# Patient Record
Sex: MALE | Race: WHITE
[De-identification: names, ages, dates, MRNs, and addresses within clinical notes are randomized per-mention and may not be internally consistent; named-entity substitution may affect disease eponyms.]

---

## 2017-11-24 ENCOUNTER — HOSPITAL ENCOUNTER (EMERGENCY)
Dept: HOSPITAL 8 - ED | Age: 52
Discharge: HOME | End: 2017-11-24
Payer: MEDICAID

## 2017-11-24 VITALS — DIASTOLIC BLOOD PRESSURE: 89 MMHG | SYSTOLIC BLOOD PRESSURE: 138 MMHG

## 2017-11-24 VITALS — HEIGHT: 76 IN | WEIGHT: 288.81 LBS | BODY MASS INDEX: 35.17 KG/M2

## 2017-11-24 DIAGNOSIS — Z76.0: Primary | ICD-10-CM

## 2017-11-24 DIAGNOSIS — E11.9: ICD-10-CM

## 2017-11-24 DIAGNOSIS — I10: ICD-10-CM

## 2017-11-24 PROCEDURE — 85610 PROTHROMBIN TIME: CPT

## 2017-11-24 PROCEDURE — 36415 COLL VENOUS BLD VENIPUNCTURE: CPT

## 2017-11-24 PROCEDURE — 99283 EMERGENCY DEPT VISIT LOW MDM: CPT

## 2019-03-11 ENCOUNTER — HOSPITAL ENCOUNTER (INPATIENT)
Dept: HOSPITAL 8 - ED | Age: 54
LOS: 2 days | Discharge: PSYCHIATRIC OR SUBSTANCE USE FACILITY | DRG: 918 | End: 2019-03-13
Attending: HOSPITALIST | Admitting: HOSPITALIST
Payer: MEDICAID

## 2019-03-11 VITALS — WEIGHT: 310.41 LBS | HEIGHT: 76 IN | BODY MASS INDEX: 37.8 KG/M2

## 2019-03-11 VITALS — DIASTOLIC BLOOD PRESSURE: 87 MMHG | SYSTOLIC BLOOD PRESSURE: 147 MMHG

## 2019-03-11 DIAGNOSIS — T39.1X2A: ICD-10-CM

## 2019-03-11 DIAGNOSIS — F43.10: ICD-10-CM

## 2019-03-11 DIAGNOSIS — E11.9: ICD-10-CM

## 2019-03-11 DIAGNOSIS — M19.90: ICD-10-CM

## 2019-03-11 DIAGNOSIS — T60.4X2A: Primary | ICD-10-CM

## 2019-03-11 DIAGNOSIS — F12.90: ICD-10-CM

## 2019-03-11 DIAGNOSIS — F31.9: ICD-10-CM
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
## 2019-03-11 DIAGNOSIS — I10: ICD-10-CM

## 2019-03-11 DIAGNOSIS — Z81.8: ICD-10-CM

## 2019-03-11 DIAGNOSIS — D68.59: ICD-10-CM

## 2019-03-11 DIAGNOSIS — R45.851: ICD-10-CM

## 2019-03-11 LAB
ALBUMIN SERPL-MCNC: 4.1 G/DL (ref 3.4–5)
ALP SERPL-CCNC: 87 U/L (ref 45–117)
ALT SERPL-CCNC: 52 U/L (ref 12–78)
ANION GAP SERPL CALC-SCNC: 6 MMOL/L (ref 5–15)
APAP SERPL-MCNC: < 2 MCG/ML (ref 10–30)
APTT BLD: 29 SECONDS (ref 25–31)
BASOPHILS # BLD AUTO: 0.08 X10^3/UL (ref 0–0.1)
BASOPHILS NFR BLD AUTO: 1 % (ref 0–1)
BILIRUB SERPL-MCNC: 0.5 MG/DL (ref 0.2–1)
CALCIUM SERPL-MCNC: 8.4 MG/DL (ref 8.5–10.1)
CHLORIDE SERPL-SCNC: 111 MMOL/L (ref 98–107)
CREAT SERPL-MCNC: 1.07 MG/DL (ref 0.7–1.3)
EOSINOPHIL # BLD AUTO: 0.14 X10^3/UL (ref 0–0.4)
EOSINOPHIL NFR BLD AUTO: 2 % (ref 1–7)
ERYTHROCYTE [DISTWIDTH] IN BLOOD BY AUTOMATED COUNT: 13.7 % (ref 9.4–14.8)
INR PPP: 0.96 (ref 0.93–1.1)
LYMPHOCYTES # BLD AUTO: 3.04 X10^3/UL (ref 1–3.4)
LYMPHOCYTES NFR BLD AUTO: 36 % (ref 22–44)
MCH RBC QN AUTO: 30.2 PG (ref 27.5–34.5)
MCHC RBC AUTO-ENTMCNC: 33.3 G/DL (ref 33.2–36.2)
MCV RBC AUTO: 90.6 FL (ref 81–97)
MD: NO
MONOCYTES # BLD AUTO: 0.8 X10^3/UL (ref 0.2–0.8)
MONOCYTES NFR BLD AUTO: 9 % (ref 2–9)
NEUTROPHILS # BLD AUTO: 4.44 X10^3/UL (ref 1.8–6.8)
NEUTROPHILS NFR BLD AUTO: 52 % (ref 42–75)
O2 FLOW: (no result) L/MIN
PH BLDV: 7.42 PH (ref 7.32–7.42)
PLATELET # BLD AUTO: 191 X10^3/UL (ref 130–400)
PMV BLD AUTO: 10.9 FL (ref 7.4–10.4)
PROT SERPL-MCNC: 7 G/DL (ref 6.4–8.2)
PROTHROMBIN TIME: 10.1 SECONDS (ref 9.6–11.5)
RBC # BLD AUTO: 5.46 X10^6/UL (ref 4.38–5.82)
VANCOMYCIN TROUGH SERPL-MCNC: < 1.7 MG/DL (ref 2.8–20)

## 2019-03-11 PROCEDURE — 84100 ASSAY OF PHOSPHORUS: CPT

## 2019-03-11 PROCEDURE — 80329 ANALGESICS NON-OPIOID 1 OR 2: CPT

## 2019-03-11 PROCEDURE — 85025 COMPLETE CBC W/AUTO DIFF WBC: CPT

## 2019-03-11 PROCEDURE — 84443 ASSAY THYROID STIM HORMONE: CPT

## 2019-03-11 PROCEDURE — 85730 THROMBOPLASTIN TIME PARTIAL: CPT

## 2019-03-11 PROCEDURE — 83735 ASSAY OF MAGNESIUM: CPT

## 2019-03-11 PROCEDURE — 99285 EMERGENCY DEPT VISIT HI MDM: CPT

## 2019-03-11 PROCEDURE — 80307 DRUG TEST PRSMV CHEM ANLYZR: CPT

## 2019-03-11 PROCEDURE — 93005 ELECTROCARDIOGRAM TRACING: CPT

## 2019-03-11 PROCEDURE — G0480 DRUG TEST DEF 1-7 CLASSES: HCPCS

## 2019-03-11 PROCEDURE — 94640 AIRWAY INHALATION TREATMENT: CPT

## 2019-03-11 PROCEDURE — 85610 PROTHROMBIN TIME: CPT

## 2019-03-11 PROCEDURE — 82803 BLOOD GASES ANY COMBINATION: CPT

## 2019-03-11 PROCEDURE — 80053 COMPREHEN METABOLIC PANEL: CPT

## 2019-03-11 PROCEDURE — 36415 COLL VENOUS BLD VENIPUNCTURE: CPT

## 2019-03-11 RX ADMIN — SODIUM CHLORIDE, SODIUM LACTATE, POTASSIUM CHLORIDE, AND CALCIUM CHLORIDE SCH MLS/HR: .6; .31; .03; .02 INJECTION, SOLUTION INTRAVENOUS at 20:30

## 2019-03-11 NOTE — NUR
i am assuming care of this pt from simón (pallavi) at this time. sbar report was 
exchanged at the bedside.

## 2019-03-11 NOTE — NUR
PT AMBULATED TO AND FROM THE RESTROOM WITH A STEADY GAIT. URINE SAMPLE 
PROVIDED, AND WALKED TO THE LAB FOR ANALYSIS.

## 2019-03-11 NOTE — NUR
BIBA FOR SUICIDE ATTEMPT. PT INGESTED ARSENIC X 2 TODAY "I THOUGHT IT WOULD BE 
QUICK".  FIRST INGESTION WAS AROUND 9AM STATES IT WAS ABOUT A PINCH, WHICH HE 
WAS ABLE TO KEEP DOWN. THEN AROUND "LUNCHTIME" INGESTED ABOUT A TEASPOON, 
STARTED VOMITING ALMOST IMMEDIATELY. NOW STATES HE HAS A SHARP HEADACHE, DIZZY, 
FACE AND TONGUE ARE NUMB.  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 
ANTIDEPRESSANTS

## 2019-03-11 NOTE — NUR
REPORT RECEIVED FROM BAILEE DUPREE, ASSUMED CARE OF PT. P RESTING QUIETLY, NO 
DISTRESS NOTED, STATES CAN'T GET TASTE OUT OF HIS MOUTH. CALM AND COOPERATIVE, 
NO S/S INTERNAL BLEEDING

## 2019-03-11 NOTE — NUR
TASK RN:

PT IS RESTING IN ROOM WITH NADN, EQUAL CHEST RISE AND GOOD CAP REFILL, NADN. NO 
NEEDS AT THIS TIME.

## 2019-03-11 NOTE — NUR
PTS BELONGINGS LOCKED UP IN SECURE LOCKER AND LABELED. PT WILL BE MOVED TO A 
SECURED ROOM WHEN AVAILABLE. PT  PLACED ON CONT. SPO2, BP, AND CARDIAC MONITOR, 
VSS.

## 2019-03-11 NOTE — NUR
PT IS RESTING COMFORTABLY ON AN E.R. GURNEY WHILE AWAITING THE RESULTS OF 
DIAGNOSTICS. THERE ARE NO ACUTE CHANGES NTED AT THIS TIME, AND I WILL CONTINUE 
TO MONITOR AND TREAT AS ORDERED, AS WELL AS PRN.

## 2019-03-12 VITALS — SYSTOLIC BLOOD PRESSURE: 138 MMHG | DIASTOLIC BLOOD PRESSURE: 89 MMHG

## 2019-03-12 VITALS — DIASTOLIC BLOOD PRESSURE: 87 MMHG | SYSTOLIC BLOOD PRESSURE: 143 MMHG

## 2019-03-12 VITALS — SYSTOLIC BLOOD PRESSURE: 146 MMHG | DIASTOLIC BLOOD PRESSURE: 86 MMHG

## 2019-03-12 VITALS — DIASTOLIC BLOOD PRESSURE: 88 MMHG | SYSTOLIC BLOOD PRESSURE: 137 MMHG

## 2019-03-12 LAB
ALBUMIN SERPL-MCNC: 3.3 G/DL (ref 3.4–5)
ALP SERPL-CCNC: 75 U/L (ref 45–117)
ALT SERPL-CCNC: 45 U/L (ref 12–78)
ANION GAP SERPL CALC-SCNC: 4 MMOL/L (ref 5–15)
BASOPHILS # BLD AUTO: 0.03 X10^3/UL (ref 0–0.1)
BASOPHILS NFR BLD AUTO: 0 % (ref 0–1)
BILIRUB SERPL-MCNC: 0.6 MG/DL (ref 0.2–1)
CALCIUM SERPL-MCNC: 8 MG/DL (ref 8.5–10.1)
CHLORIDE SERPL-SCNC: 111 MMOL/L (ref 98–107)
CREAT SERPL-MCNC: 0.9 MG/DL (ref 0.7–1.3)
EOSINOPHIL # BLD AUTO: 0.18 X10^3/UL (ref 0–0.4)
EOSINOPHIL NFR BLD AUTO: 3 % (ref 1–7)
ERYTHROCYTE [DISTWIDTH] IN BLOOD BY AUTOMATED COUNT: 13.8 % (ref 9.4–14.8)
LYMPHOCYTES # BLD AUTO: 1.64 X10^3/UL (ref 1–3.4)
LYMPHOCYTES NFR BLD AUTO: 26 % (ref 22–44)
MCH RBC QN AUTO: 30.1 PG (ref 27.5–34.5)
MCHC RBC AUTO-ENTMCNC: 33.1 G/DL (ref 33.2–36.2)
MCV RBC AUTO: 90.7 FL (ref 81–97)
MD: NO
MONOCYTES # BLD AUTO: 0.57 X10^3/UL (ref 0.2–0.8)
MONOCYTES NFR BLD AUTO: 9 % (ref 2–9)
NEUTROPHILS # BLD AUTO: 3.81 X10^3/UL (ref 1.8–6.8)
NEUTROPHILS NFR BLD AUTO: 61 % (ref 42–75)
PLATELET # BLD AUTO: 164 X10^3/UL (ref 130–400)
PMV BLD AUTO: 10.1 FL (ref 7.4–10.4)
PROT SERPL-MCNC: 6.4 G/DL (ref 6.4–8.2)
RBC # BLD AUTO: 5.24 X10^6/UL (ref 4.38–5.82)
TSH SERPL-ACNC: 1.45 MIU/L (ref 0.36–3.74)

## 2019-03-12 RX ADMIN — CYCLOBENZAPRINE HYDROCHLORIDE PRN MG: 10 TABLET, FILM COATED ORAL at 01:02

## 2019-03-12 RX ADMIN — CYCLOBENZAPRINE HYDROCHLORIDE PRN MG: 10 TABLET, FILM COATED ORAL at 10:35

## 2019-03-12 RX ADMIN — CYCLOBENZAPRINE HYDROCHLORIDE PRN MG: 10 TABLET, FILM COATED ORAL at 20:42

## 2019-03-12 RX ADMIN — SODIUM CHLORIDE, SODIUM LACTATE, POTASSIUM CHLORIDE, AND CALCIUM CHLORIDE SCH MLS/HR: .6; .31; .03; .02 INJECTION, SOLUTION INTRAVENOUS at 15:03

## 2019-03-13 ENCOUNTER — HOSPITAL ENCOUNTER (INPATIENT)
Dept: HOSPITAL 8 - 3E | Age: 54
LOS: 44 days | Discharge: HOME | DRG: 885 | End: 2019-04-26
Attending: PSYCHIATRY & NEUROLOGY | Admitting: PSYCHIATRY & NEUROLOGY
Payer: MEDICAID

## 2019-03-13 VITALS — HEIGHT: 76 IN | BODY MASS INDEX: 34.42 KG/M2 | WEIGHT: 282.63 LBS

## 2019-03-13 VITALS — SYSTOLIC BLOOD PRESSURE: 123 MMHG | DIASTOLIC BLOOD PRESSURE: 71 MMHG

## 2019-03-13 VITALS — SYSTOLIC BLOOD PRESSURE: 138 MMHG | DIASTOLIC BLOOD PRESSURE: 92 MMHG

## 2019-03-13 VITALS — SYSTOLIC BLOOD PRESSURE: 127 MMHG | DIASTOLIC BLOOD PRESSURE: 73 MMHG

## 2019-03-13 VITALS — DIASTOLIC BLOOD PRESSURE: 93 MMHG | SYSTOLIC BLOOD PRESSURE: 150 MMHG

## 2019-03-13 VITALS — DIASTOLIC BLOOD PRESSURE: 76 MMHG | SYSTOLIC BLOOD PRESSURE: 122 MMHG

## 2019-03-13 DIAGNOSIS — Z59.0: ICD-10-CM

## 2019-03-13 DIAGNOSIS — Z81.8: ICD-10-CM

## 2019-03-13 DIAGNOSIS — G89.29: ICD-10-CM

## 2019-03-13 DIAGNOSIS — E78.5: ICD-10-CM

## 2019-03-13 DIAGNOSIS — I10: ICD-10-CM

## 2019-03-13 DIAGNOSIS — F12.10: ICD-10-CM

## 2019-03-13 DIAGNOSIS — M10.9: ICD-10-CM

## 2019-03-13 DIAGNOSIS — Z79.899: ICD-10-CM

## 2019-03-13 DIAGNOSIS — F31.30: Primary | ICD-10-CM

## 2019-03-13 DIAGNOSIS — E66.9: ICD-10-CM

## 2019-03-13 DIAGNOSIS — Z82.49: ICD-10-CM

## 2019-03-13 DIAGNOSIS — G47.00: ICD-10-CM

## 2019-03-13 PROCEDURE — 80053 COMPREHEN METABOLIC PANEL: CPT

## 2019-03-13 PROCEDURE — 85025 COMPLETE CBC W/AUTO DIFF WBC: CPT

## 2019-03-13 PROCEDURE — 93005 ELECTROCARDIOGRAM TRACING: CPT

## 2019-03-13 PROCEDURE — 80061 LIPID PANEL: CPT

## 2019-03-13 PROCEDURE — 36415 COLL VENOUS BLD VENIPUNCTURE: CPT

## 2019-03-13 PROCEDURE — 82272 OCCULT BLD FECES 1-3 TESTS: CPT

## 2019-03-13 RX ADMIN — Medication SCH MG: at 22:50

## 2019-03-13 RX ADMIN — ACETAMINOPHEN PRN MG: 325 TABLET, FILM COATED ORAL at 16:51

## 2019-03-13 RX ADMIN — CYCLOBENZAPRINE HYDROCHLORIDE PRN MG: 10 TABLET, FILM COATED ORAL at 05:10

## 2019-03-13 RX ADMIN — ACETAMINOPHEN PRN MG: 325 TABLET, FILM COATED ORAL at 20:31

## 2019-03-13 RX ADMIN — SODIUM CHLORIDE, SODIUM LACTATE, POTASSIUM CHLORIDE, AND CALCIUM CHLORIDE SCH MLS/HR: .6; .31; .03; .02 INJECTION, SOLUTION INTRAVENOUS at 09:12

## 2019-03-13 SDOH — ECONOMIC STABILITY - HOUSING INSECURITY: HOMELESSNESS: Z59.0

## 2019-03-14 VITALS — SYSTOLIC BLOOD PRESSURE: 133 MMHG | DIASTOLIC BLOOD PRESSURE: 84 MMHG

## 2019-03-14 VITALS — DIASTOLIC BLOOD PRESSURE: 86 MMHG | SYSTOLIC BLOOD PRESSURE: 129 MMHG

## 2019-03-14 RX ADMIN — Medication SCH MG: at 20:27

## 2019-03-14 RX ADMIN — ACETAMINOPHEN PRN MG: 325 TABLET, FILM COATED ORAL at 15:21

## 2019-03-14 RX ADMIN — OXCARBAZEPINE SCH MG: 150 TABLET, FILM COATED ORAL at 20:27

## 2019-03-15 VITALS — DIASTOLIC BLOOD PRESSURE: 86 MMHG | SYSTOLIC BLOOD PRESSURE: 136 MMHG

## 2019-03-15 VITALS — DIASTOLIC BLOOD PRESSURE: 98 MMHG | SYSTOLIC BLOOD PRESSURE: 137 MMHG

## 2019-03-15 RX ADMIN — BUPROPION HYDROCHLORIDE SCH MG: 150 TABLET, EXTENDED RELEASE ORAL at 09:06

## 2019-03-15 RX ADMIN — QUETIAPINE FUMARATE SCH MG: 100 TABLET ORAL at 19:57

## 2019-03-15 RX ADMIN — OXCARBAZEPINE SCH MG: 150 TABLET, FILM COATED ORAL at 19:57

## 2019-03-15 RX ADMIN — Medication SCH MG: at 19:56

## 2019-03-15 RX ADMIN — ACETAMINOPHEN PRN MG: 325 TABLET, FILM COATED ORAL at 09:31

## 2019-03-16 VITALS — SYSTOLIC BLOOD PRESSURE: 134 MMHG | DIASTOLIC BLOOD PRESSURE: 97 MMHG

## 2019-03-16 VITALS — DIASTOLIC BLOOD PRESSURE: 85 MMHG | SYSTOLIC BLOOD PRESSURE: 113 MMHG

## 2019-03-16 RX ADMIN — QUETIAPINE FUMARATE SCH MG: 100 TABLET ORAL at 19:34

## 2019-03-16 RX ADMIN — OXCARBAZEPINE SCH MG: 150 TABLET, FILM COATED ORAL at 19:34

## 2019-03-16 RX ADMIN — ACETAMINOPHEN PRN MG: 325 TABLET, FILM COATED ORAL at 19:34

## 2019-03-16 RX ADMIN — BUPROPION HYDROCHLORIDE SCH MG: 150 TABLET, EXTENDED RELEASE ORAL at 09:26

## 2019-03-16 RX ADMIN — Medication SCH MG: at 19:34

## 2019-03-17 VITALS — DIASTOLIC BLOOD PRESSURE: 95 MMHG | SYSTOLIC BLOOD PRESSURE: 134 MMHG

## 2019-03-17 VITALS — DIASTOLIC BLOOD PRESSURE: 81 MMHG | SYSTOLIC BLOOD PRESSURE: 123 MMHG

## 2019-03-17 RX ADMIN — Medication SCH MG: at 20:21

## 2019-03-17 RX ADMIN — OXCARBAZEPINE SCH MG: 150 TABLET, FILM COATED ORAL at 20:21

## 2019-03-17 RX ADMIN — QUETIAPINE FUMARATE SCH MG: 100 TABLET ORAL at 20:21

## 2019-03-17 RX ADMIN — BUPROPION HYDROCHLORIDE SCH MG: 150 TABLET, EXTENDED RELEASE ORAL at 09:40

## 2019-03-17 RX ADMIN — ACETAMINOPHEN PRN MG: 325 TABLET, FILM COATED ORAL at 14:40

## 2019-03-18 VITALS — SYSTOLIC BLOOD PRESSURE: 123 MMHG | DIASTOLIC BLOOD PRESSURE: 83 MMHG

## 2019-03-18 VITALS — SYSTOLIC BLOOD PRESSURE: 138 MMHG | DIASTOLIC BLOOD PRESSURE: 89 MMHG

## 2019-03-18 RX ADMIN — Medication SCH MG: at 20:24

## 2019-03-18 RX ADMIN — ACETAMINOPHEN PRN MG: 325 TABLET, FILM COATED ORAL at 08:18

## 2019-03-18 RX ADMIN — BUPROPION HYDROCHLORIDE SCH MG: 150 TABLET, EXTENDED RELEASE ORAL at 08:15

## 2019-03-18 RX ADMIN — QUETIAPINE FUMARATE SCH MG: 100 TABLET ORAL at 20:24

## 2019-03-18 RX ADMIN — OXCARBAZEPINE SCH MG: 150 TABLET, FILM COATED ORAL at 20:24

## 2019-03-19 VITALS — DIASTOLIC BLOOD PRESSURE: 86 MMHG | SYSTOLIC BLOOD PRESSURE: 136 MMHG

## 2019-03-19 VITALS — DIASTOLIC BLOOD PRESSURE: 78 MMHG | SYSTOLIC BLOOD PRESSURE: 146 MMHG

## 2019-03-19 RX ADMIN — BUPROPION HYDROCHLORIDE SCH MG: 150 TABLET, EXTENDED RELEASE ORAL at 11:35

## 2019-03-19 RX ADMIN — QUETIAPINE FUMARATE SCH MG: 100 TABLET ORAL at 20:52

## 2019-03-19 RX ADMIN — OXCARBAZEPINE SCH MG: 150 TABLET, FILM COATED ORAL at 20:49

## 2019-03-19 RX ADMIN — Medication SCH MG: at 20:50

## 2019-03-20 VITALS — SYSTOLIC BLOOD PRESSURE: 118 MMHG | DIASTOLIC BLOOD PRESSURE: 82 MMHG

## 2019-03-20 VITALS — SYSTOLIC BLOOD PRESSURE: 110 MMHG | DIASTOLIC BLOOD PRESSURE: 72 MMHG

## 2019-03-20 RX ADMIN — ACETAMINOPHEN PRN MG: 325 TABLET, FILM COATED ORAL at 20:53

## 2019-03-20 RX ADMIN — ALLOPURINOL SCH MG: 300 TABLET ORAL at 08:49

## 2019-03-20 RX ADMIN — BUPROPION HYDROCHLORIDE SCH MG: 150 TABLET, EXTENDED RELEASE ORAL at 08:49

## 2019-03-20 RX ADMIN — Medication SCH MG: at 20:52

## 2019-03-20 RX ADMIN — ACETAMINOPHEN PRN MG: 325 TABLET, FILM COATED ORAL at 08:49

## 2019-03-20 RX ADMIN — OXCARBAZEPINE SCH MG: 150 TABLET, FILM COATED ORAL at 20:53

## 2019-03-20 RX ADMIN — QUETIAPINE FUMARATE SCH MG: 100 TABLET ORAL at 20:53

## 2019-03-21 VITALS — DIASTOLIC BLOOD PRESSURE: 86 MMHG | SYSTOLIC BLOOD PRESSURE: 142 MMHG

## 2019-03-21 VITALS — SYSTOLIC BLOOD PRESSURE: 137 MMHG | DIASTOLIC BLOOD PRESSURE: 97 MMHG

## 2019-03-21 RX ADMIN — ACETAMINOPHEN PRN MG: 325 TABLET, FILM COATED ORAL at 08:59

## 2019-03-21 RX ADMIN — BUPROPION HYDROCHLORIDE SCH MG: 150 TABLET, EXTENDED RELEASE ORAL at 08:59

## 2019-03-21 RX ADMIN — Medication SCH MG: at 21:28

## 2019-03-21 RX ADMIN — ALLOPURINOL SCH MG: 300 TABLET ORAL at 08:59

## 2019-03-21 RX ADMIN — QUETIAPINE FUMARATE SCH MG: 100 TABLET ORAL at 21:28

## 2019-03-21 RX ADMIN — OXCARBAZEPINE SCH MG: 150 TABLET, FILM COATED ORAL at 21:28

## 2019-03-22 VITALS — SYSTOLIC BLOOD PRESSURE: 127 MMHG | DIASTOLIC BLOOD PRESSURE: 88 MMHG

## 2019-03-22 VITALS — SYSTOLIC BLOOD PRESSURE: 128 MMHG | DIASTOLIC BLOOD PRESSURE: 95 MMHG

## 2019-03-22 RX ADMIN — QUETIAPINE FUMARATE SCH MG: 100 TABLET ORAL at 21:56

## 2019-03-22 RX ADMIN — Medication SCH MG: at 21:56

## 2019-03-22 RX ADMIN — BUPROPION HYDROCHLORIDE SCH MG: 150 TABLET, EXTENDED RELEASE ORAL at 08:32

## 2019-03-22 RX ADMIN — OXCARBAZEPINE SCH MG: 150 TABLET, FILM COATED ORAL at 21:55

## 2019-03-22 RX ADMIN — ALLOPURINOL SCH MG: 300 TABLET ORAL at 08:33

## 2019-03-23 VITALS — DIASTOLIC BLOOD PRESSURE: 90 MMHG | SYSTOLIC BLOOD PRESSURE: 149 MMHG

## 2019-03-23 VITALS — DIASTOLIC BLOOD PRESSURE: 100 MMHG | SYSTOLIC BLOOD PRESSURE: 138 MMHG

## 2019-03-23 LAB
CHOL/HDL RATIO: 6.6
HDL CHOL %: 15 % (ref 26–37)
HDL CHOLESTEROL (DIRECT): 28 MG/DL (ref 40–60)
LDL CHOLESTEROL,CALCULATED: 108 MG/DL (ref 54–169)
LDLC/HDLC SERPL: 3.9 {RATIO} (ref 0.5–3)
TRIGL SERPL-MCNC: 241 MG/DL (ref 50–200)
VLDLC SERPL CALC-MCNC: 48 MG/DL (ref 0–25)

## 2019-03-23 RX ADMIN — OXCARBAZEPINE SCH MG: 150 TABLET, FILM COATED ORAL at 20:16

## 2019-03-23 RX ADMIN — Medication SCH MG: at 20:16

## 2019-03-23 RX ADMIN — BUPROPION HYDROCHLORIDE SCH MG: 150 TABLET, EXTENDED RELEASE ORAL at 09:36

## 2019-03-23 RX ADMIN — ALLOPURINOL SCH MG: 300 TABLET ORAL at 09:36

## 2019-03-23 RX ADMIN — QUETIAPINE FUMARATE SCH MG: 100 TABLET ORAL at 20:16

## 2019-03-24 VITALS — DIASTOLIC BLOOD PRESSURE: 94 MMHG | SYSTOLIC BLOOD PRESSURE: 130 MMHG

## 2019-03-24 VITALS — SYSTOLIC BLOOD PRESSURE: 136 MMHG | DIASTOLIC BLOOD PRESSURE: 88 MMHG

## 2019-03-24 RX ADMIN — Medication SCH MG: at 19:54

## 2019-03-24 RX ADMIN — BUPROPION HYDROCHLORIDE SCH MG: 150 TABLET, EXTENDED RELEASE ORAL at 08:20

## 2019-03-24 RX ADMIN — QUETIAPINE FUMARATE SCH MG: 100 TABLET ORAL at 19:54

## 2019-03-24 RX ADMIN — OXCARBAZEPINE SCH MG: 150 TABLET, FILM COATED ORAL at 19:54

## 2019-03-24 RX ADMIN — ALLOPURINOL SCH MG: 300 TABLET ORAL at 08:21

## 2019-03-25 VITALS — SYSTOLIC BLOOD PRESSURE: 139 MMHG | DIASTOLIC BLOOD PRESSURE: 97 MMHG

## 2019-03-25 VITALS — SYSTOLIC BLOOD PRESSURE: 132 MMHG | DIASTOLIC BLOOD PRESSURE: 81 MMHG

## 2019-03-25 RX ADMIN — Medication SCH MG: at 20:07

## 2019-03-25 RX ADMIN — BUPROPION HYDROCHLORIDE SCH MG: 150 TABLET, EXTENDED RELEASE ORAL at 09:00

## 2019-03-25 RX ADMIN — OXCARBAZEPINE SCH MG: 150 TABLET, FILM COATED ORAL at 20:04

## 2019-03-25 RX ADMIN — Medication SCH MG: at 20:04

## 2019-03-25 RX ADMIN — ALLOPURINOL SCH MG: 300 TABLET ORAL at 09:00

## 2019-03-25 RX ADMIN — QUETIAPINE FUMARATE SCH MG: 100 TABLET ORAL at 20:04

## 2019-03-26 VITALS — SYSTOLIC BLOOD PRESSURE: 129 MMHG | DIASTOLIC BLOOD PRESSURE: 81 MMHG

## 2019-03-26 VITALS — SYSTOLIC BLOOD PRESSURE: 130 MMHG | DIASTOLIC BLOOD PRESSURE: 95 MMHG

## 2019-03-26 RX ADMIN — BUPROPION HYDROCHLORIDE SCH MG: 150 TABLET, EXTENDED RELEASE ORAL at 09:57

## 2019-03-26 RX ADMIN — QUETIAPINE FUMARATE SCH MG: 100 TABLET ORAL at 20:28

## 2019-03-26 RX ADMIN — Medication SCH MG: at 20:28

## 2019-03-26 RX ADMIN — OXCARBAZEPINE SCH MG: 150 TABLET, FILM COATED ORAL at 20:28

## 2019-03-26 RX ADMIN — ALLOPURINOL SCH MG: 300 TABLET ORAL at 09:57

## 2019-03-27 VITALS — SYSTOLIC BLOOD PRESSURE: 100 MMHG | DIASTOLIC BLOOD PRESSURE: 65 MMHG

## 2019-03-27 VITALS — SYSTOLIC BLOOD PRESSURE: 137 MMHG | DIASTOLIC BLOOD PRESSURE: 92 MMHG

## 2019-03-27 RX ADMIN — BUPROPION HYDROCHLORIDE SCH MG: 150 TABLET, EXTENDED RELEASE ORAL at 08:30

## 2019-03-27 RX ADMIN — ALLOPURINOL SCH MG: 300 TABLET ORAL at 08:30

## 2019-03-27 RX ADMIN — QUETIAPINE FUMARATE SCH MG: 100 TABLET ORAL at 20:24

## 2019-03-27 RX ADMIN — OXCARBAZEPINE SCH MG: 150 TABLET, FILM COATED ORAL at 20:24

## 2019-03-27 RX ADMIN — Medication SCH MG: at 20:24

## 2019-03-28 VITALS — SYSTOLIC BLOOD PRESSURE: 129 MMHG | DIASTOLIC BLOOD PRESSURE: 86 MMHG

## 2019-03-28 VITALS — SYSTOLIC BLOOD PRESSURE: 129 MMHG | DIASTOLIC BLOOD PRESSURE: 88 MMHG

## 2019-03-28 RX ADMIN — ALLOPURINOL SCH MG: 300 TABLET ORAL at 08:08

## 2019-03-28 RX ADMIN — QUETIAPINE FUMARATE SCH MG: 100 TABLET ORAL at 20:07

## 2019-03-28 RX ADMIN — BUPROPION HYDROCHLORIDE SCH MG: 150 TABLET, EXTENDED RELEASE ORAL at 08:08

## 2019-03-28 RX ADMIN — Medication SCH MG: at 20:07

## 2019-03-28 RX ADMIN — OXCARBAZEPINE SCH MG: 150 TABLET, FILM COATED ORAL at 20:07

## 2019-03-29 VITALS — DIASTOLIC BLOOD PRESSURE: 88 MMHG | SYSTOLIC BLOOD PRESSURE: 131 MMHG

## 2019-03-29 VITALS — DIASTOLIC BLOOD PRESSURE: 68 MMHG | SYSTOLIC BLOOD PRESSURE: 104 MMHG

## 2019-03-29 RX ADMIN — ALLOPURINOL SCH MG: 300 TABLET ORAL at 08:24

## 2019-03-29 RX ADMIN — Medication SCH MG: at 20:19

## 2019-03-29 RX ADMIN — BUPROPION HYDROCHLORIDE SCH MG: 150 TABLET, EXTENDED RELEASE ORAL at 08:24

## 2019-03-29 RX ADMIN — OXCARBAZEPINE SCH MG: 150 TABLET, FILM COATED ORAL at 20:19

## 2019-03-29 RX ADMIN — QUETIAPINE FUMARATE SCH MG: 100 TABLET ORAL at 20:19

## 2019-03-30 VITALS — SYSTOLIC BLOOD PRESSURE: 120 MMHG | DIASTOLIC BLOOD PRESSURE: 83 MMHG

## 2019-03-30 VITALS — SYSTOLIC BLOOD PRESSURE: 108 MMHG | DIASTOLIC BLOOD PRESSURE: 77 MMHG

## 2019-03-30 RX ADMIN — QUETIAPINE FUMARATE SCH MG: 100 TABLET ORAL at 20:35

## 2019-03-30 RX ADMIN — OXCARBAZEPINE SCH MG: 150 TABLET, FILM COATED ORAL at 20:35

## 2019-03-30 RX ADMIN — BUPROPION HYDROCHLORIDE SCH MG: 150 TABLET, EXTENDED RELEASE ORAL at 08:40

## 2019-03-30 RX ADMIN — ALLOPURINOL SCH MG: 300 TABLET ORAL at 08:40

## 2019-03-30 RX ADMIN — Medication SCH MG: at 20:36

## 2019-03-31 VITALS — DIASTOLIC BLOOD PRESSURE: 90 MMHG | SYSTOLIC BLOOD PRESSURE: 147 MMHG

## 2019-03-31 VITALS — SYSTOLIC BLOOD PRESSURE: 138 MMHG | DIASTOLIC BLOOD PRESSURE: 87 MMHG

## 2019-03-31 RX ADMIN — OXCARBAZEPINE SCH MG: 150 TABLET, FILM COATED ORAL at 20:28

## 2019-03-31 RX ADMIN — Medication SCH MG: at 20:28

## 2019-03-31 RX ADMIN — ALLOPURINOL SCH MG: 300 TABLET ORAL at 09:08

## 2019-03-31 RX ADMIN — BUPROPION HYDROCHLORIDE SCH MG: 150 TABLET, EXTENDED RELEASE ORAL at 09:08

## 2019-03-31 RX ADMIN — ACETAMINOPHEN PRN MG: 325 TABLET, FILM COATED ORAL at 20:28

## 2019-03-31 RX ADMIN — QUETIAPINE FUMARATE SCH MG: 100 TABLET ORAL at 20:28

## 2019-04-01 VITALS — DIASTOLIC BLOOD PRESSURE: 87 MMHG | SYSTOLIC BLOOD PRESSURE: 128 MMHG

## 2019-04-01 VITALS — DIASTOLIC BLOOD PRESSURE: 85 MMHG | SYSTOLIC BLOOD PRESSURE: 124 MMHG

## 2019-04-01 RX ADMIN — QUETIAPINE FUMARATE SCH MG: 100 TABLET ORAL at 20:39

## 2019-04-01 RX ADMIN — Medication SCH MG: at 20:39

## 2019-04-01 RX ADMIN — OXCARBAZEPINE SCH MG: 150 TABLET, FILM COATED ORAL at 20:43

## 2019-04-01 RX ADMIN — QUETIAPINE FUMARATE SCH MG: 100 TABLET ORAL at 20:43

## 2019-04-01 RX ADMIN — ALLOPURINOL SCH MG: 300 TABLET ORAL at 08:24

## 2019-04-01 RX ADMIN — OXCARBAZEPINE SCH MG: 150 TABLET, FILM COATED ORAL at 20:39

## 2019-04-01 RX ADMIN — BUPROPION HYDROCHLORIDE SCH MG: 150 TABLET, EXTENDED RELEASE ORAL at 08:23

## 2019-04-01 RX ADMIN — Medication SCH MG: at 20:43

## 2019-04-02 VITALS — SYSTOLIC BLOOD PRESSURE: 129 MMHG | DIASTOLIC BLOOD PRESSURE: 83 MMHG

## 2019-04-02 VITALS — SYSTOLIC BLOOD PRESSURE: 126 MMHG | DIASTOLIC BLOOD PRESSURE: 86 MMHG

## 2019-04-02 RX ADMIN — BUPROPION HYDROCHLORIDE SCH MG: 150 TABLET, EXTENDED RELEASE ORAL at 08:39

## 2019-04-02 RX ADMIN — OXCARBAZEPINE SCH MG: 150 TABLET, FILM COATED ORAL at 20:10

## 2019-04-02 RX ADMIN — Medication SCH MG: at 20:10

## 2019-04-02 RX ADMIN — ALLOPURINOL SCH MG: 300 TABLET ORAL at 08:38

## 2019-04-02 RX ADMIN — QUETIAPINE FUMARATE SCH MG: 100 TABLET ORAL at 20:10

## 2019-04-03 VITALS — DIASTOLIC BLOOD PRESSURE: 80 MMHG | SYSTOLIC BLOOD PRESSURE: 115 MMHG

## 2019-04-03 VITALS — DIASTOLIC BLOOD PRESSURE: 78 MMHG | SYSTOLIC BLOOD PRESSURE: 125 MMHG

## 2019-04-03 RX ADMIN — OXCARBAZEPINE SCH MG: 150 TABLET, FILM COATED ORAL at 20:18

## 2019-04-03 RX ADMIN — BUPROPION HYDROCHLORIDE SCH MG: 150 TABLET, EXTENDED RELEASE ORAL at 09:00

## 2019-04-03 RX ADMIN — ALLOPURINOL SCH MG: 300 TABLET ORAL at 09:00

## 2019-04-03 RX ADMIN — Medication SCH MG: at 20:18

## 2019-04-03 RX ADMIN — QUETIAPINE FUMARATE SCH MG: 100 TABLET ORAL at 20:18

## 2019-04-03 RX ADMIN — BUPROPION HYDROCHLORIDE SCH MG: 150 TABLET, EXTENDED RELEASE ORAL at 13:07

## 2019-04-04 VITALS — SYSTOLIC BLOOD PRESSURE: 135 MMHG | DIASTOLIC BLOOD PRESSURE: 96 MMHG

## 2019-04-04 VITALS — SYSTOLIC BLOOD PRESSURE: 129 MMHG | DIASTOLIC BLOOD PRESSURE: 87 MMHG

## 2019-04-04 VITALS — DIASTOLIC BLOOD PRESSURE: 83 MMHG | SYSTOLIC BLOOD PRESSURE: 132 MMHG

## 2019-04-04 LAB
ALBUMIN SERPL-MCNC: 3.9 G/DL (ref 3.4–5)
ALP SERPL-CCNC: 93 U/L (ref 45–117)
ALT SERPL-CCNC: 55 U/L (ref 12–78)
ANION GAP SERPL CALC-SCNC: 8 MMOL/L (ref 5–15)
BASOPHILS # BLD AUTO: 0.03 X10^3/UL (ref 0–0.1)
BASOPHILS NFR BLD AUTO: 1 % (ref 0–1)
BILIRUB SERPL-MCNC: 0.7 MG/DL (ref 0.2–1)
CALCIUM SERPL-MCNC: 8.9 MG/DL (ref 8.5–10.1)
CHLORIDE SERPL-SCNC: 110 MMOL/L (ref 98–107)
CREAT SERPL-MCNC: 1.08 MG/DL (ref 0.7–1.3)
EOSINOPHIL # BLD AUTO: 0.11 X10^3/UL (ref 0–0.4)
EOSINOPHIL NFR BLD AUTO: 2 % (ref 1–7)
ERYTHROCYTE [DISTWIDTH] IN BLOOD BY AUTOMATED COUNT: 13.5 % (ref 9.4–14.8)
LYMPHOCYTES # BLD AUTO: 1.28 X10^3/UL (ref 1–3.4)
LYMPHOCYTES NFR BLD AUTO: 23 % (ref 22–44)
MCH RBC QN AUTO: 30.7 PG (ref 27.5–34.5)
MCHC RBC AUTO-ENTMCNC: 34.1 G/DL (ref 33.2–36.2)
MCV RBC AUTO: 89.8 FL (ref 81–97)
MD: NO
MONOCYTES # BLD AUTO: 0.45 X10^3/UL (ref 0.2–0.8)
MONOCYTES NFR BLD AUTO: 8 % (ref 2–9)
NEUTROPHILS # BLD AUTO: 3.64 X10^3/UL (ref 1.8–6.8)
NEUTROPHILS NFR BLD AUTO: 66 % (ref 42–75)
PLATELET # BLD AUTO: 186 X10^3/UL (ref 130–400)
PMV BLD AUTO: 10.7 FL (ref 7.4–10.4)
PROT SERPL-MCNC: 6.9 G/DL (ref 6.4–8.2)
RBC # BLD AUTO: 5.63 X10^6/UL (ref 4.38–5.82)

## 2019-04-04 RX ADMIN — Medication SCH MG: at 20:34

## 2019-04-04 RX ADMIN — ALLOPURINOL SCH MG: 300 TABLET ORAL at 08:16

## 2019-04-04 RX ADMIN — QUETIAPINE FUMARATE SCH MG: 100 TABLET ORAL at 21:00

## 2019-04-04 RX ADMIN — Medication SCH MG: at 21:00

## 2019-04-04 RX ADMIN — SODIUM CHLORIDE SCH MLS/HR: 0.9 INJECTION, SOLUTION INTRAVENOUS at 20:00

## 2019-04-04 RX ADMIN — BUPROPION HYDROCHLORIDE SCH MG: 150 TABLET, EXTENDED RELEASE ORAL at 08:16

## 2019-04-04 RX ADMIN — OXCARBAZEPINE SCH MG: 150 TABLET, FILM COATED ORAL at 21:00

## 2019-04-04 RX ADMIN — BUPROPION HYDROCHLORIDE SCH MG: 150 TABLET, EXTENDED RELEASE ORAL at 11:43

## 2019-04-04 RX ADMIN — QUETIAPINE FUMARATE SCH MG: 100 TABLET ORAL at 20:35

## 2019-04-04 RX ADMIN — OXCARBAZEPINE SCH MG: 150 TABLET, FILM COATED ORAL at 20:35

## 2019-04-05 VITALS — SYSTOLIC BLOOD PRESSURE: 132 MMHG | DIASTOLIC BLOOD PRESSURE: 82 MMHG

## 2019-04-05 VITALS — DIASTOLIC BLOOD PRESSURE: 88 MMHG | SYSTOLIC BLOOD PRESSURE: 138 MMHG

## 2019-04-05 RX ADMIN — Medication SCH MG: at 21:03

## 2019-04-05 RX ADMIN — BUPROPION HYDROCHLORIDE SCH MG: 150 TABLET, EXTENDED RELEASE ORAL at 08:55

## 2019-04-05 RX ADMIN — BUPROPION HYDROCHLORIDE SCH MG: 150 TABLET, EXTENDED RELEASE ORAL at 12:00

## 2019-04-05 RX ADMIN — FENOFIBRATE SCH MG: 145 TABLET, FILM COATED ORAL at 08:55

## 2019-04-05 RX ADMIN — QUETIAPINE FUMARATE SCH MG: 100 TABLET ORAL at 21:03

## 2019-04-05 RX ADMIN — SODIUM CHLORIDE SCH MLS/HR: 0.9 INJECTION, SOLUTION INTRAVENOUS at 13:15

## 2019-04-05 RX ADMIN — ALLOPURINOL SCH MG: 300 TABLET ORAL at 08:55

## 2019-04-05 RX ADMIN — OXCARBAZEPINE SCH MG: 150 TABLET, FILM COATED ORAL at 21:03

## 2019-04-05 RX ADMIN — SODIUM CHLORIDE SCH MLS/HR: 0.9 INJECTION, SOLUTION INTRAVENOUS at 04:55

## 2019-04-06 VITALS — SYSTOLIC BLOOD PRESSURE: 138 MMHG | DIASTOLIC BLOOD PRESSURE: 87 MMHG

## 2019-04-06 VITALS — SYSTOLIC BLOOD PRESSURE: 120 MMHG | DIASTOLIC BLOOD PRESSURE: 78 MMHG

## 2019-04-06 RX ADMIN — ALLOPURINOL SCH MG: 300 TABLET ORAL at 08:38

## 2019-04-06 RX ADMIN — Medication SCH MG: at 20:00

## 2019-04-06 RX ADMIN — FENOFIBRATE SCH MG: 145 TABLET, FILM COATED ORAL at 08:39

## 2019-04-06 RX ADMIN — BUPROPION HYDROCHLORIDE SCH MG: 150 TABLET, EXTENDED RELEASE ORAL at 12:14

## 2019-04-06 RX ADMIN — QUETIAPINE FUMARATE SCH MG: 100 TABLET ORAL at 20:00

## 2019-04-06 RX ADMIN — BUPROPION HYDROCHLORIDE SCH MG: 150 TABLET, EXTENDED RELEASE ORAL at 08:39

## 2019-04-06 RX ADMIN — ACETAMINOPHEN PRN MG: 325 TABLET, FILM COATED ORAL at 12:16

## 2019-04-06 RX ADMIN — OXCARBAZEPINE SCH MG: 150 TABLET, FILM COATED ORAL at 20:00

## 2019-04-07 VITALS — SYSTOLIC BLOOD PRESSURE: 133 MMHG | DIASTOLIC BLOOD PRESSURE: 84 MMHG

## 2019-04-07 VITALS — SYSTOLIC BLOOD PRESSURE: 148 MMHG | DIASTOLIC BLOOD PRESSURE: 98 MMHG

## 2019-04-07 RX ADMIN — Medication SCH MG: at 20:33

## 2019-04-07 RX ADMIN — BUPROPION HYDROCHLORIDE SCH MG: 150 TABLET, EXTENDED RELEASE ORAL at 09:52

## 2019-04-07 RX ADMIN — FENOFIBRATE SCH MG: 145 TABLET, FILM COATED ORAL at 09:52

## 2019-04-07 RX ADMIN — QUETIAPINE FUMARATE SCH MG: 100 TABLET ORAL at 20:33

## 2019-04-07 RX ADMIN — BUPROPION HYDROCHLORIDE SCH MG: 150 TABLET, EXTENDED RELEASE ORAL at 12:28

## 2019-04-07 RX ADMIN — OXCARBAZEPINE SCH MG: 150 TABLET, FILM COATED ORAL at 20:33

## 2019-04-07 RX ADMIN — ALLOPURINOL SCH MG: 300 TABLET ORAL at 09:52

## 2019-04-08 VITALS — SYSTOLIC BLOOD PRESSURE: 121 MMHG | DIASTOLIC BLOOD PRESSURE: 83 MMHG

## 2019-04-08 VITALS — SYSTOLIC BLOOD PRESSURE: 138 MMHG | DIASTOLIC BLOOD PRESSURE: 87 MMHG

## 2019-04-08 RX ADMIN — ACETAMINOPHEN PRN MG: 325 TABLET, FILM COATED ORAL at 08:36

## 2019-04-08 RX ADMIN — OXCARBAZEPINE SCH MG: 150 TABLET, FILM COATED ORAL at 20:23

## 2019-04-08 RX ADMIN — BUPROPION HYDROCHLORIDE SCH MG: 150 TABLET, EXTENDED RELEASE ORAL at 12:28

## 2019-04-08 RX ADMIN — BUPROPION HYDROCHLORIDE SCH MG: 150 TABLET, EXTENDED RELEASE ORAL at 12:32

## 2019-04-08 RX ADMIN — BUPROPION HYDROCHLORIDE SCH MG: 150 TABLET, EXTENDED RELEASE ORAL at 08:36

## 2019-04-08 RX ADMIN — FENOFIBRATE SCH MG: 145 TABLET, FILM COATED ORAL at 08:36

## 2019-04-08 RX ADMIN — ALLOPURINOL SCH MG: 300 TABLET ORAL at 08:37

## 2019-04-08 RX ADMIN — QUETIAPINE FUMARATE SCH MG: 100 TABLET ORAL at 20:23

## 2019-04-08 RX ADMIN — Medication SCH MG: at 20:23

## 2019-04-09 VITALS — DIASTOLIC BLOOD PRESSURE: 88 MMHG | SYSTOLIC BLOOD PRESSURE: 125 MMHG

## 2019-04-09 VITALS — SYSTOLIC BLOOD PRESSURE: 123 MMHG | DIASTOLIC BLOOD PRESSURE: 83 MMHG

## 2019-04-09 RX ADMIN — OXCARBAZEPINE SCH MG: 150 TABLET, FILM COATED ORAL at 21:17

## 2019-04-09 RX ADMIN — ALLOPURINOL SCH MG: 300 TABLET ORAL at 08:14

## 2019-04-09 RX ADMIN — BUPROPION HYDROCHLORIDE SCH MG: 150 TABLET, EXTENDED RELEASE ORAL at 13:13

## 2019-04-09 RX ADMIN — QUETIAPINE FUMARATE SCH MG: 100 TABLET ORAL at 21:17

## 2019-04-09 RX ADMIN — BUPROPION HYDROCHLORIDE SCH MG: 150 TABLET, EXTENDED RELEASE ORAL at 08:14

## 2019-04-09 RX ADMIN — Medication SCH MG: at 21:17

## 2019-04-09 RX ADMIN — FENOFIBRATE SCH MG: 145 TABLET, FILM COATED ORAL at 08:14

## 2019-04-10 VITALS — SYSTOLIC BLOOD PRESSURE: 118 MMHG | DIASTOLIC BLOOD PRESSURE: 73 MMHG

## 2019-04-10 VITALS — SYSTOLIC BLOOD PRESSURE: 137 MMHG | DIASTOLIC BLOOD PRESSURE: 107 MMHG

## 2019-04-10 RX ADMIN — BUPROPION HYDROCHLORIDE SCH MG: 150 TABLET, EXTENDED RELEASE ORAL at 14:26

## 2019-04-10 RX ADMIN — OXCARBAZEPINE SCH MG: 150 TABLET, FILM COATED ORAL at 20:24

## 2019-04-10 RX ADMIN — ALLOPURINOL SCH MG: 300 TABLET ORAL at 08:14

## 2019-04-10 RX ADMIN — BUPROPION HYDROCHLORIDE SCH MG: 150 TABLET, EXTENDED RELEASE ORAL at 08:14

## 2019-04-10 RX ADMIN — FENOFIBRATE SCH MG: 145 TABLET, FILM COATED ORAL at 08:14

## 2019-04-10 RX ADMIN — ACETAMINOPHEN PRN MG: 325 TABLET, FILM COATED ORAL at 20:24

## 2019-04-10 RX ADMIN — Medication SCH MG: at 20:24

## 2019-04-10 RX ADMIN — QUETIAPINE FUMARATE SCH MG: 100 TABLET ORAL at 20:25

## 2019-04-11 VITALS — SYSTOLIC BLOOD PRESSURE: 126 MMHG | DIASTOLIC BLOOD PRESSURE: 82 MMHG

## 2019-04-11 VITALS — SYSTOLIC BLOOD PRESSURE: 128 MMHG | DIASTOLIC BLOOD PRESSURE: 85 MMHG

## 2019-04-11 RX ADMIN — Medication SCH MG: at 20:28

## 2019-04-11 RX ADMIN — BUPROPION HYDROCHLORIDE SCH MG: 150 TABLET, EXTENDED RELEASE ORAL at 11:48

## 2019-04-11 RX ADMIN — OXCARBAZEPINE SCH MG: 150 TABLET, FILM COATED ORAL at 20:28

## 2019-04-11 RX ADMIN — QUETIAPINE FUMARATE SCH MG: 100 TABLET ORAL at 20:28

## 2019-04-11 RX ADMIN — ALLOPURINOL SCH MG: 300 TABLET ORAL at 08:41

## 2019-04-11 RX ADMIN — BUPROPION HYDROCHLORIDE SCH MG: 150 TABLET, EXTENDED RELEASE ORAL at 08:41

## 2019-04-11 RX ADMIN — FENOFIBRATE SCH MG: 145 TABLET, FILM COATED ORAL at 08:41

## 2019-04-12 VITALS — SYSTOLIC BLOOD PRESSURE: 142 MMHG | DIASTOLIC BLOOD PRESSURE: 90 MMHG

## 2019-04-12 VITALS — DIASTOLIC BLOOD PRESSURE: 86 MMHG | SYSTOLIC BLOOD PRESSURE: 119 MMHG

## 2019-04-12 RX ADMIN — BUPROPION HYDROCHLORIDE SCH MG: 150 TABLET, EXTENDED RELEASE ORAL at 11:15

## 2019-04-12 RX ADMIN — ALLOPURINOL SCH MG: 300 TABLET ORAL at 08:05

## 2019-04-12 RX ADMIN — QUETIAPINE FUMARATE SCH MG: 100 TABLET ORAL at 20:56

## 2019-04-12 RX ADMIN — Medication SCH MG: at 20:56

## 2019-04-12 RX ADMIN — BUPROPION HYDROCHLORIDE SCH MG: 150 TABLET, EXTENDED RELEASE ORAL at 08:04

## 2019-04-12 RX ADMIN — FENOFIBRATE SCH MG: 145 TABLET, FILM COATED ORAL at 08:05

## 2019-04-12 RX ADMIN — OXCARBAZEPINE SCH MG: 150 TABLET, FILM COATED ORAL at 20:56

## 2019-04-13 VITALS — DIASTOLIC BLOOD PRESSURE: 92 MMHG | SYSTOLIC BLOOD PRESSURE: 125 MMHG

## 2019-04-13 VITALS — SYSTOLIC BLOOD PRESSURE: 132 MMHG | DIASTOLIC BLOOD PRESSURE: 86 MMHG

## 2019-04-13 RX ADMIN — ALLOPURINOL SCH MG: 300 TABLET ORAL at 09:12

## 2019-04-13 RX ADMIN — OXCARBAZEPINE SCH MG: 150 TABLET, FILM COATED ORAL at 20:10

## 2019-04-13 RX ADMIN — FENOFIBRATE SCH MG: 145 TABLET, FILM COATED ORAL at 09:12

## 2019-04-13 RX ADMIN — BUPROPION HYDROCHLORIDE SCH MG: 150 TABLET, EXTENDED RELEASE ORAL at 12:52

## 2019-04-13 RX ADMIN — QUETIAPINE FUMARATE SCH MG: 100 TABLET ORAL at 20:10

## 2019-04-13 RX ADMIN — Medication SCH MG: at 20:10

## 2019-04-13 RX ADMIN — BUPROPION HYDROCHLORIDE SCH MG: 150 TABLET, EXTENDED RELEASE ORAL at 09:12

## 2019-04-14 VITALS — SYSTOLIC BLOOD PRESSURE: 139 MMHG | DIASTOLIC BLOOD PRESSURE: 89 MMHG

## 2019-04-14 VITALS — SYSTOLIC BLOOD PRESSURE: 145 MMHG | DIASTOLIC BLOOD PRESSURE: 90 MMHG

## 2019-04-14 VITALS — SYSTOLIC BLOOD PRESSURE: 122 MMHG | DIASTOLIC BLOOD PRESSURE: 87 MMHG

## 2019-04-14 RX ADMIN — BUPROPION HYDROCHLORIDE SCH MG: 150 TABLET, EXTENDED RELEASE ORAL at 08:45

## 2019-04-14 RX ADMIN — Medication SCH MG: at 20:03

## 2019-04-14 RX ADMIN — FENOFIBRATE SCH MG: 145 TABLET, FILM COATED ORAL at 08:45

## 2019-04-14 RX ADMIN — QUETIAPINE FUMARATE SCH MG: 100 TABLET ORAL at 20:03

## 2019-04-14 RX ADMIN — BUPROPION HYDROCHLORIDE SCH MG: 150 TABLET, EXTENDED RELEASE ORAL at 12:05

## 2019-04-14 RX ADMIN — ALLOPURINOL SCH MG: 300 TABLET ORAL at 08:45

## 2019-04-14 RX ADMIN — OXCARBAZEPINE SCH MG: 150 TABLET, FILM COATED ORAL at 20:03

## 2019-04-15 VITALS — SYSTOLIC BLOOD PRESSURE: 127 MMHG | DIASTOLIC BLOOD PRESSURE: 85 MMHG

## 2019-04-15 VITALS — SYSTOLIC BLOOD PRESSURE: 122 MMHG | DIASTOLIC BLOOD PRESSURE: 89 MMHG

## 2019-04-15 RX ADMIN — Medication SCH MG: at 20:16

## 2019-04-15 RX ADMIN — BUPROPION HYDROCHLORIDE SCH MG: 150 TABLET, EXTENDED RELEASE ORAL at 12:01

## 2019-04-15 RX ADMIN — BUPROPION HYDROCHLORIDE SCH MG: 150 TABLET, EXTENDED RELEASE ORAL at 08:07

## 2019-04-15 RX ADMIN — OXCARBAZEPINE SCH MG: 150 TABLET, FILM COATED ORAL at 20:16

## 2019-04-15 RX ADMIN — FENOFIBRATE SCH MG: 145 TABLET, FILM COATED ORAL at 08:07

## 2019-04-15 RX ADMIN — ACETAMINOPHEN PRN MG: 325 TABLET, FILM COATED ORAL at 08:07

## 2019-04-15 RX ADMIN — ALLOPURINOL SCH MG: 300 TABLET ORAL at 08:07

## 2019-04-15 RX ADMIN — QUETIAPINE FUMARATE SCH MG: 100 TABLET ORAL at 20:16

## 2019-04-16 VITALS — DIASTOLIC BLOOD PRESSURE: 92 MMHG | SYSTOLIC BLOOD PRESSURE: 141 MMHG

## 2019-04-16 VITALS — SYSTOLIC BLOOD PRESSURE: 125 MMHG | DIASTOLIC BLOOD PRESSURE: 82 MMHG

## 2019-04-16 LAB — GFR SERPL CREATININE-BSD FRML MDRD: NEGATIVE ML/MIN/{1.73_M2}

## 2019-04-16 RX ADMIN — FENOFIBRATE SCH MG: 145 TABLET, FILM COATED ORAL at 07:45

## 2019-04-16 RX ADMIN — QUETIAPINE FUMARATE SCH MG: 100 TABLET ORAL at 19:57

## 2019-04-16 RX ADMIN — BUPROPION HYDROCHLORIDE SCH MG: 150 TABLET, EXTENDED RELEASE ORAL at 13:33

## 2019-04-16 RX ADMIN — Medication SCH MG: at 19:57

## 2019-04-16 RX ADMIN — OXCARBAZEPINE SCH MG: 150 TABLET, FILM COATED ORAL at 19:57

## 2019-04-16 RX ADMIN — ALLOPURINOL SCH MG: 300 TABLET ORAL at 07:45

## 2019-04-16 RX ADMIN — ACETAMINOPHEN PRN MG: 325 TABLET, FILM COATED ORAL at 19:05

## 2019-04-16 RX ADMIN — BUPROPION HYDROCHLORIDE SCH MG: 150 TABLET, EXTENDED RELEASE ORAL at 07:45

## 2019-04-17 VITALS — DIASTOLIC BLOOD PRESSURE: 84 MMHG | SYSTOLIC BLOOD PRESSURE: 126 MMHG

## 2019-04-17 VITALS — SYSTOLIC BLOOD PRESSURE: 130 MMHG | DIASTOLIC BLOOD PRESSURE: 94 MMHG

## 2019-04-17 RX ADMIN — Medication SCH MG: at 20:24

## 2019-04-17 RX ADMIN — ALLOPURINOL SCH MG: 300 TABLET ORAL at 09:00

## 2019-04-17 RX ADMIN — QUETIAPINE FUMARATE SCH MG: 100 TABLET ORAL at 20:24

## 2019-04-17 RX ADMIN — BUPROPION HYDROCHLORIDE SCH MG: 150 TABLET, EXTENDED RELEASE ORAL at 12:00

## 2019-04-17 RX ADMIN — BUPROPION HYDROCHLORIDE SCH MG: 150 TABLET, EXTENDED RELEASE ORAL at 08:00

## 2019-04-17 RX ADMIN — OXCARBAZEPINE SCH MG: 150 TABLET, FILM COATED ORAL at 20:24

## 2019-04-17 RX ADMIN — ACETAMINOPHEN PRN MG: 325 TABLET, FILM COATED ORAL at 20:11

## 2019-04-17 RX ADMIN — FENOFIBRATE SCH MG: 145 TABLET, FILM COATED ORAL at 09:00

## 2019-04-18 VITALS — SYSTOLIC BLOOD PRESSURE: 132 MMHG | DIASTOLIC BLOOD PRESSURE: 88 MMHG

## 2019-04-18 VITALS — DIASTOLIC BLOOD PRESSURE: 89 MMHG | SYSTOLIC BLOOD PRESSURE: 135 MMHG

## 2019-04-18 RX ADMIN — BUPROPION HYDROCHLORIDE SCH MG: 150 TABLET, EXTENDED RELEASE ORAL at 08:03

## 2019-04-18 RX ADMIN — ACETAMINOPHEN PRN MG: 325 TABLET, FILM COATED ORAL at 17:27

## 2019-04-18 RX ADMIN — FENOFIBRATE SCH MG: 145 TABLET, FILM COATED ORAL at 08:03

## 2019-04-18 RX ADMIN — QUETIAPINE FUMARATE SCH MG: 100 TABLET ORAL at 20:04

## 2019-04-18 RX ADMIN — BUPROPION HYDROCHLORIDE SCH MG: 150 TABLET, EXTENDED RELEASE ORAL at 11:30

## 2019-04-18 RX ADMIN — ALLOPURINOL SCH MG: 300 TABLET ORAL at 08:04

## 2019-04-18 RX ADMIN — OXCARBAZEPINE SCH MG: 150 TABLET, FILM COATED ORAL at 20:04

## 2019-04-18 RX ADMIN — Medication SCH MG: at 20:04

## 2019-04-19 VITALS — DIASTOLIC BLOOD PRESSURE: 88 MMHG | SYSTOLIC BLOOD PRESSURE: 130 MMHG

## 2019-04-19 VITALS — DIASTOLIC BLOOD PRESSURE: 93 MMHG | SYSTOLIC BLOOD PRESSURE: 124 MMHG

## 2019-04-19 RX ADMIN — ALLOPURINOL SCH MG: 300 TABLET ORAL at 08:27

## 2019-04-19 RX ADMIN — OXCARBAZEPINE SCH MG: 150 TABLET, FILM COATED ORAL at 20:00

## 2019-04-19 RX ADMIN — QUETIAPINE FUMARATE SCH MG: 100 TABLET ORAL at 20:00

## 2019-04-19 RX ADMIN — BUPROPION HYDROCHLORIDE SCH MG: 150 TABLET, EXTENDED RELEASE ORAL at 08:27

## 2019-04-19 RX ADMIN — BUPROPION HYDROCHLORIDE SCH MG: 150 TABLET, EXTENDED RELEASE ORAL at 14:52

## 2019-04-19 RX ADMIN — FENOFIBRATE SCH MG: 145 TABLET, FILM COATED ORAL at 08:27

## 2019-04-19 RX ADMIN — Medication SCH MG: at 20:00

## 2019-04-20 VITALS — SYSTOLIC BLOOD PRESSURE: 126 MMHG | DIASTOLIC BLOOD PRESSURE: 90 MMHG

## 2019-04-20 VITALS — SYSTOLIC BLOOD PRESSURE: 138 MMHG | DIASTOLIC BLOOD PRESSURE: 82 MMHG

## 2019-04-20 RX ADMIN — Medication SCH MG: at 21:29

## 2019-04-20 RX ADMIN — OXCARBAZEPINE SCH MG: 150 TABLET, FILM COATED ORAL at 21:29

## 2019-04-20 RX ADMIN — ACETAMINOPHEN PRN MG: 325 TABLET, FILM COATED ORAL at 21:28

## 2019-04-20 RX ADMIN — QUETIAPINE FUMARATE SCH MG: 100 TABLET ORAL at 21:28

## 2019-04-20 RX ADMIN — FENOFIBRATE SCH MG: 145 TABLET, FILM COATED ORAL at 08:38

## 2019-04-20 RX ADMIN — BUPROPION HYDROCHLORIDE SCH MG: 150 TABLET, EXTENDED RELEASE ORAL at 08:38

## 2019-04-20 RX ADMIN — ALLOPURINOL SCH MG: 300 TABLET ORAL at 08:38

## 2019-04-21 VITALS — SYSTOLIC BLOOD PRESSURE: 128 MMHG | DIASTOLIC BLOOD PRESSURE: 93 MMHG

## 2019-04-21 VITALS — SYSTOLIC BLOOD PRESSURE: 134 MMHG | DIASTOLIC BLOOD PRESSURE: 82 MMHG

## 2019-04-21 RX ADMIN — OXCARBAZEPINE SCH MG: 150 TABLET, FILM COATED ORAL at 20:29

## 2019-04-21 RX ADMIN — BUPROPION HYDROCHLORIDE SCH MG: 100 TABLET, FILM COATED, EXTENDED RELEASE ORAL at 08:11

## 2019-04-21 RX ADMIN — Medication SCH MG: at 20:29

## 2019-04-21 RX ADMIN — FENOFIBRATE SCH MG: 145 TABLET, FILM COATED ORAL at 08:11

## 2019-04-21 RX ADMIN — ALLOPURINOL SCH MG: 300 TABLET ORAL at 08:11

## 2019-04-21 RX ADMIN — ACETAMINOPHEN PRN MG: 325 TABLET, FILM COATED ORAL at 17:21

## 2019-04-21 RX ADMIN — BUPROPION HYDROCHLORIDE SCH MG: 100 TABLET, FILM COATED, EXTENDED RELEASE ORAL at 12:29

## 2019-04-21 RX ADMIN — QUETIAPINE FUMARATE SCH MG: 100 TABLET ORAL at 20:29

## 2019-04-22 VITALS — SYSTOLIC BLOOD PRESSURE: 114 MMHG | DIASTOLIC BLOOD PRESSURE: 77 MMHG

## 2019-04-22 VITALS — SYSTOLIC BLOOD PRESSURE: 124 MMHG | DIASTOLIC BLOOD PRESSURE: 88 MMHG

## 2019-04-22 RX ADMIN — Medication SCH MG: at 20:23

## 2019-04-22 RX ADMIN — ALLOPURINOL SCH MG: 300 TABLET ORAL at 08:15

## 2019-04-22 RX ADMIN — FENOFIBRATE SCH MG: 145 TABLET, FILM COATED ORAL at 08:15

## 2019-04-22 RX ADMIN — ACETAMINOPHEN PRN MG: 325 TABLET, FILM COATED ORAL at 10:50

## 2019-04-22 RX ADMIN — BUPROPION HYDROCHLORIDE SCH MG: 100 TABLET, FILM COATED, EXTENDED RELEASE ORAL at 12:03

## 2019-04-22 RX ADMIN — QUETIAPINE FUMARATE SCH MG: 100 TABLET ORAL at 20:23

## 2019-04-22 RX ADMIN — BUPROPION HYDROCHLORIDE SCH MG: 100 TABLET, FILM COATED, EXTENDED RELEASE ORAL at 08:15

## 2019-04-22 RX ADMIN — OXCARBAZEPINE SCH MG: 150 TABLET, FILM COATED ORAL at 20:23

## 2019-04-23 VITALS — SYSTOLIC BLOOD PRESSURE: 130 MMHG | DIASTOLIC BLOOD PRESSURE: 86 MMHG

## 2019-04-23 VITALS — SYSTOLIC BLOOD PRESSURE: 128 MMHG | DIASTOLIC BLOOD PRESSURE: 92 MMHG

## 2019-04-23 RX ADMIN — QUETIAPINE FUMARATE SCH MG: 100 TABLET ORAL at 20:25

## 2019-04-23 RX ADMIN — Medication SCH MG: at 20:25

## 2019-04-23 RX ADMIN — ALLOPURINOL SCH MG: 300 TABLET ORAL at 08:07

## 2019-04-23 RX ADMIN — BUPROPION HYDROCHLORIDE SCH MG: 100 TABLET, FILM COATED, EXTENDED RELEASE ORAL at 08:07

## 2019-04-23 RX ADMIN — BUPROPION HYDROCHLORIDE SCH MG: 100 TABLET, FILM COATED, EXTENDED RELEASE ORAL at 14:17

## 2019-04-23 RX ADMIN — FENOFIBRATE SCH MG: 145 TABLET, FILM COATED ORAL at 08:07

## 2019-04-23 RX ADMIN — OXCARBAZEPINE SCH MG: 150 TABLET, FILM COATED ORAL at 20:25

## 2019-04-24 VITALS — DIASTOLIC BLOOD PRESSURE: 87 MMHG | SYSTOLIC BLOOD PRESSURE: 125 MMHG

## 2019-04-24 VITALS — DIASTOLIC BLOOD PRESSURE: 80 MMHG | SYSTOLIC BLOOD PRESSURE: 118 MMHG

## 2019-04-24 RX ADMIN — FENOFIBRATE SCH MG: 145 TABLET, FILM COATED ORAL at 08:21

## 2019-04-24 RX ADMIN — QUETIAPINE FUMARATE SCH MG: 100 TABLET ORAL at 20:31

## 2019-04-24 RX ADMIN — BUPROPION HYDROCHLORIDE SCH MG: 100 TABLET, FILM COATED, EXTENDED RELEASE ORAL at 08:21

## 2019-04-24 RX ADMIN — ALLOPURINOL SCH MG: 300 TABLET ORAL at 08:21

## 2019-04-24 RX ADMIN — Medication SCH MG: at 20:31

## 2019-04-24 RX ADMIN — BUPROPION HYDROCHLORIDE SCH MG: 100 TABLET, FILM COATED, EXTENDED RELEASE ORAL at 12:07

## 2019-04-24 RX ADMIN — OXCARBAZEPINE SCH MG: 150 TABLET, FILM COATED ORAL at 20:31

## 2019-04-25 VITALS — DIASTOLIC BLOOD PRESSURE: 88 MMHG | SYSTOLIC BLOOD PRESSURE: 121 MMHG

## 2019-04-25 VITALS — DIASTOLIC BLOOD PRESSURE: 93 MMHG | SYSTOLIC BLOOD PRESSURE: 130 MMHG

## 2019-04-25 RX ADMIN — Medication SCH MG: at 21:00

## 2019-04-25 RX ADMIN — BUPROPION HYDROCHLORIDE SCH MG: 100 TABLET, FILM COATED, EXTENDED RELEASE ORAL at 12:31

## 2019-04-25 RX ADMIN — ACETAMINOPHEN PRN MG: 325 TABLET, FILM COATED ORAL at 12:34

## 2019-04-25 RX ADMIN — ALLOPURINOL SCH MG: 300 TABLET ORAL at 08:09

## 2019-04-25 RX ADMIN — NAPROXEN SCH MG: 500 TABLET ORAL at 21:00

## 2019-04-25 RX ADMIN — FENOFIBRATE SCH MG: 145 TABLET, FILM COATED ORAL at 08:09

## 2019-04-25 RX ADMIN — BUPROPION HYDROCHLORIDE SCH MG: 100 TABLET, FILM COATED, EXTENDED RELEASE ORAL at 08:09

## 2019-04-25 RX ADMIN — QUETIAPINE FUMARATE SCH MG: 100 TABLET ORAL at 21:00

## 2019-04-26 VITALS — DIASTOLIC BLOOD PRESSURE: 92 MMHG | SYSTOLIC BLOOD PRESSURE: 131 MMHG

## 2019-04-26 RX ADMIN — BUPROPION HYDROCHLORIDE SCH MG: 100 TABLET, FILM COATED, EXTENDED RELEASE ORAL at 08:23

## 2019-04-26 RX ADMIN — ALLOPURINOL SCH MG: 300 TABLET ORAL at 08:23

## 2019-04-26 RX ADMIN — FENOFIBRATE SCH MG: 145 TABLET, FILM COATED ORAL at 08:23

## 2019-04-26 RX ADMIN — NAPROXEN SCH MG: 500 TABLET ORAL at 08:23
